# Patient Record
(demographics unavailable — no encounter records)

---

## 2024-10-16 NOTE — DISCUSSION/SUMMARY
[de-identified] : This patient presents today for follow-up regarding long head of bicep tendon rupture.  The MRI confirms long head of bicep tendon rupture.  No evidence of any other significant pathology noted.  I discussed the diagnosis and treatment recommendations.  I recommended avoidance of any pulling pushing and lifting for another 3 weeks.  If the patient is having worsening of symptoms patient will see us back in office at that time for reevaluation.  At least 20 minutes was spent performing the evaluation and management on today's office visit.  This includes but is not limited to preparing to see patient including review of any test results or outside medical records, obtaining and/or reviewing separately obtained history, performing examination and evaluation, counseling and educating the patient on their diagnosis and treatment recommendations, ordering medications, tests, or procedures, documenting clinical information in the electronic health record, independently interpreting results (not separately reported) and communicating results to the patient, and coordination of care.

## 2024-10-16 NOTE — HISTORY OF PRESENT ILLNESS
[de-identified] : This patient presents for follow-up regarding right shoulder bicep tendon rupture.  Patient continues to have some soreness about the anterior aspect of the shoulder 2 out of 10.  Pain shoots into the bicep area intermittently with activity.  Denies radicular symptoms or numbness and tingling.  Patient did have an MRI to rule out bicep tendon tear and also to look for any tearing of his rotator cuff.  He presents today to review the MRI results.

## 2024-10-16 NOTE — DISCUSSION/SUMMARY
[de-identified] : This patient presents today for follow-up regarding long head of bicep tendon rupture.  The MRI confirms long head of bicep tendon rupture.  No evidence of any other significant pathology noted.  I discussed the diagnosis and treatment recommendations.  I recommended avoidance of any pulling pushing and lifting for another 3 weeks.  If the patient is having worsening of symptoms patient will see us back in office at that time for reevaluation.  At least 20 minutes was spent performing the evaluation and management on today's office visit.  This includes but is not limited to preparing to see patient including review of any test results or outside medical records, obtaining and/or reviewing separately obtained history, performing examination and evaluation, counseling and educating the patient on their diagnosis and treatment recommendations, ordering medications, tests, or procedures, documenting clinical information in the electronic health record, independently interpreting results (not separately reported) and communicating results to the patient, and coordination of care.

## 2024-10-16 NOTE — PHYSICAL EXAM
[de-identified] : The patient appears well nourished  and in no apparent distress.  The patient is alert and oriented to person, place, and time.   Affect and mood appear normal.    The head is normocephalic and atraumatic.  The eyes reveal normal sclera and extra ocular muscles are intact.   The neck appears normal with no jugular venous distention or masses noted.   Skin shows normal turgor with no evidence of eczema or psoriasis.  No respiratory distress noted.  The patient ambulates with a normal gait.  The right shoulder has satisfactory range of motion.  Mild discomfort noted with terminal motion.  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  There is no pain with range of motion.  There is a negative impingement sign.  There is a negative Urias sign.   Rotator cuff strength is normal.  There is no tenderness to palpation.   There is no soft tissue swelling.  There is no eccyhmosis.  There is no warmth or erythema.    There is no instabililty on exam to anterior drawer or load and shift.  No lymphadenopathy or edema is noted.  Pulses and capillary refill are normal.  There is no muscular atrophy.  Strength and sensation are intact distally.   [de-identified] : The MRI was reviewed.  There is a long head of bicep tendon rupture.  No rotator cuff tear noted.

## 2024-10-16 NOTE — HISTORY OF PRESENT ILLNESS
[de-identified] : This patient presents for follow-up regarding right shoulder bicep tendon rupture.  Patient continues to have some soreness about the anterior aspect of the shoulder 2 out of 10.  Pain shoots into the bicep area intermittently with activity.  Denies radicular symptoms or numbness and tingling.  Patient did have an MRI to rule out bicep tendon tear and also to look for any tearing of his rotator cuff.  He presents today to review the MRI results.

## 2024-10-16 NOTE — PHYSICAL EXAM
[de-identified] : The patient appears well nourished  and in no apparent distress.  The patient is alert and oriented to person, place, and time.   Affect and mood appear normal.    The head is normocephalic and atraumatic.  The eyes reveal normal sclera and extra ocular muscles are intact.   The neck appears normal with no jugular venous distention or masses noted.   Skin shows normal turgor with no evidence of eczema or psoriasis.  No respiratory distress noted.  The patient ambulates with a normal gait.  The right shoulder has satisfactory range of motion.  Mild discomfort noted with terminal motion.  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  There is no pain with range of motion.  There is a negative impingement sign.  There is a negative Urias sign.   Rotator cuff strength is normal.  There is no tenderness to palpation.   There is no soft tissue swelling.  There is no eccyhmosis.  There is no warmth or erythema.    There is no instabililty on exam to anterior drawer or load and shift.  No lymphadenopathy or edema is noted.  Pulses and capillary refill are normal.  There is no muscular atrophy.  Strength and sensation are intact distally.   [de-identified] : The MRI was reviewed.  There is a long head of bicep tendon rupture.  No rotator cuff tear noted.